# Patient Record
Sex: FEMALE | Race: ASIAN
[De-identification: names, ages, dates, MRNs, and addresses within clinical notes are randomized per-mention and may not be internally consistent; named-entity substitution may affect disease eponyms.]

---

## 2019-06-04 ENCOUNTER — HOSPITAL ENCOUNTER (OUTPATIENT)
Dept: HOSPITAL 76 - EMS | Age: 39
Discharge: ANOTHER HOSPITAL | End: 2019-06-04
Attending: SURGERY
Payer: COMMERCIAL

## 2019-06-04 ENCOUNTER — HOSPITAL ENCOUNTER (OUTPATIENT)
Dept: HOSPITAL 73 - ED | Age: 39
Setting detail: OBSERVATION
LOS: 4 days | Discharge: HOME | End: 2019-06-08
Payer: OTHER GOVERNMENT

## 2019-06-04 VITALS
OXYGEN SATURATION: 99 % | DIASTOLIC BLOOD PRESSURE: 98 MMHG | RESPIRATION RATE: 14 BRPM | SYSTOLIC BLOOD PRESSURE: 153 MMHG | HEART RATE: 94 BPM

## 2019-06-04 VITALS
DIASTOLIC BLOOD PRESSURE: 100 MMHG | RESPIRATION RATE: 12 BRPM | SYSTOLIC BLOOD PRESSURE: 148 MMHG | OXYGEN SATURATION: 97 % | HEART RATE: 97 BPM

## 2019-06-04 VITALS
HEART RATE: 103 BPM | BODY MASS INDEX: 30.4 KG/M2 | SYSTOLIC BLOOD PRESSURE: 172 MMHG | OXYGEN SATURATION: 100 % | TEMPERATURE: 97.7 F | DIASTOLIC BLOOD PRESSURE: 103 MMHG | RESPIRATION RATE: 18 BRPM
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
## 2019-06-04 DIAGNOSIS — R51: ICD-10-CM

## 2019-06-04 DIAGNOSIS — M54.2: ICD-10-CM

## 2019-06-04 DIAGNOSIS — M54.5: ICD-10-CM

## 2019-06-04 DIAGNOSIS — S00.83XA: ICD-10-CM

## 2019-06-04 DIAGNOSIS — R11.0: ICD-10-CM

## 2019-06-04 DIAGNOSIS — S06.0X9A: Primary | ICD-10-CM

## 2019-06-04 DIAGNOSIS — S40.011A: ICD-10-CM

## 2019-06-04 DIAGNOSIS — R07.9: ICD-10-CM
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
## 2019-06-04 DIAGNOSIS — I10: ICD-10-CM

## 2019-06-04 DIAGNOSIS — M54.2: Primary | ICD-10-CM

## 2019-06-04 DIAGNOSIS — M25.551: ICD-10-CM

## 2019-06-04 DIAGNOSIS — R07.89: ICD-10-CM

## 2019-06-04 LAB
ADD MANUAL DIFF / SLIDE REVIEW: NO
ALBUMIN SERPL-MCNC: 4.7 G/DL (ref 3.5–5)
ALBUMIN/GLOB SERPL: 1.3 {RATIO} (ref 1–2.8)
ALP SERPL-CCNC: 108 U/L (ref 38–126)
ALT SERPL-CCNC: 33 IU/L (ref 9–52)
BUN SERPL-MCNC: 13 MG/DL (ref 7–17)
CALCIUM SERPL-MCNC: 9.3 MG/DL (ref 8.4–10.2)
CHLORIDE SERPL-SCNC: 100 MMOL/L (ref 98–107)
CO2 SERPL-SCNC: 25 MMOL/L (ref 22–32)
CULTURE INDICATED URINE: (no result)
ESTIMATED GLOMERULAR FILT RATE: > 60 ML/MIN (ref 60–?)
ETHANOL SERPL-MCNC: < 10 MG/DL
GLOBULIN SER CALC-MCNC: 3.6 G/DL (ref 1.7–4.1)
GLUCOSE SERPL-MCNC: 131 MG/DL (ref 70–100)
HEMATOCRIT: 42.2 % (ref 36–46)
HEMOGLOBIN: 14 G/DL (ref 12–16)
HEMOLYSIS: < 15 (ref 0–50)
LIPASE SERPL-CCNC: 57 U/L (ref 23–300)
LYMPHOCYTES # SPEC AUTO: 4300 /UL (ref 1100–4500)
MCV RBC: 86.3 FL (ref 80–100)
MEAN CORPUSCULAR HEMOGLOBIN: 28.5 PG (ref 26–34)
MEAN CORPUSCULAR HGB CONC: 33.1 % (ref 30–36)
PLATELET COUNT: 374 X10^3/UL (ref 150–400)
POTASSIUM SERPL-SCNC: 3.2 MMOL/L (ref 3.4–5.1)
PROT SERPL-MCNC: 8.3 G/DL (ref 6.3–8.2)
SODIUM SERPL-SCNC: 136 MMOL/L (ref 137–145)

## 2019-06-04 PROCEDURE — 96376 TX/PRO/DX INJ SAME DRUG ADON: CPT

## 2019-06-04 PROCEDURE — 97162 PT EVAL MOD COMPLEX 30 MIN: CPT

## 2019-06-04 PROCEDURE — 70450 CT HEAD/BRAIN W/O DYE: CPT

## 2019-06-04 PROCEDURE — 87797 DETECT AGENT NOS DNA DIR: CPT

## 2019-06-04 PROCEDURE — 80053 COMPREHEN METABOLIC PANEL: CPT

## 2019-06-04 PROCEDURE — 87086 URINE CULTURE/COLONY COUNT: CPT

## 2019-06-04 PROCEDURE — 97530 THERAPEUTIC ACTIVITIES: CPT

## 2019-06-04 PROCEDURE — 71260 CT THORAX DX C+: CPT

## 2019-06-04 PROCEDURE — 99219: CPT

## 2019-06-04 PROCEDURE — 80320 DRUG SCREEN QUANTALCOHOLS: CPT

## 2019-06-04 PROCEDURE — 73552 X-RAY EXAM OF FEMUR 2/>: CPT

## 2019-06-04 PROCEDURE — 83690 ASSAY OF LIPASE: CPT

## 2019-06-04 PROCEDURE — 96361 HYDRATE IV INFUSION ADD-ON: CPT

## 2019-06-04 PROCEDURE — 97116 GAIT TRAINING THERAPY: CPT

## 2019-06-04 PROCEDURE — 96375 TX/PRO/DX INJ NEW DRUG ADDON: CPT

## 2019-06-04 PROCEDURE — 74177 CT ABD & PELVIS W/CONTRAST: CPT

## 2019-06-04 PROCEDURE — 96374 THER/PROPH/DIAG INJ IV PUSH: CPT

## 2019-06-04 PROCEDURE — 99224: CPT

## 2019-06-04 PROCEDURE — 72125 CT NECK SPINE W/O DYE: CPT

## 2019-06-04 PROCEDURE — 99285 EMERGENCY DEPT VISIT HI MDM: CPT

## 2019-06-04 PROCEDURE — G0378 HOSPITAL OBSERVATION PER HR: HCPCS

## 2019-06-04 PROCEDURE — 99217: CPT

## 2019-06-04 PROCEDURE — 81003 URINALYSIS AUTO W/O SCOPE: CPT

## 2019-06-04 PROCEDURE — 36591 DRAW BLOOD OFF VENOUS DEVICE: CPT

## 2019-06-04 PROCEDURE — 99283 EMERGENCY DEPT VISIT LOW MDM: CPT

## 2019-06-04 PROCEDURE — 85025 COMPLETE CBC W/AUTO DIFF WBC: CPT

## 2019-06-04 PROCEDURE — 93005 ELECTROCARDIOGRAM TRACING: CPT

## 2019-06-04 PROCEDURE — 80048 BASIC METABOLIC PNL TOTAL CA: CPT

## 2019-06-04 PROCEDURE — 36415 COLL VENOUS BLD VENIPUNCTURE: CPT

## 2019-06-04 PROCEDURE — 81015 MICROSCOPIC EXAM OF URINE: CPT

## 2019-06-04 RX ADMIN — ONDANSETRON 1 BOTTLE: 4 TABLET, ORALLY DISINTEGRATING ORAL at 23:02

## 2019-06-04 RX ADMIN — HYDROMORPHONE HYDROCHLORIDE 0.5 MG: 1 INJECTION, SOLUTION INTRAMUSCULAR; INTRAVENOUS; SUBCUTANEOUS at 21:59

## 2019-06-04 RX ADMIN — SODIUM CHLORIDE 1000 ML: 0.9 INJECTION, SOLUTION INTRAVENOUS at 21:59

## 2019-06-04 RX ADMIN — HYDROCODONE BITARTRATE AND ACETAMINOPHEN 1 BOTTLE: 5; 325 TABLET ORAL at 23:02

## 2019-06-04 RX ADMIN — ONDANSETRON 4 MG: 2 INJECTION INTRAMUSCULAR; INTRAVENOUS at 23:25
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
## 2019-06-04 NOTE — PC.NURSE
Attempted to ambulate patient twice. Each time pt becomes very dizzy, nauseous, and vomits despite administration of zofran. Dr Rdz aware, reevaluating dispo plan.
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
## 2019-06-05 VITALS
HEART RATE: 65 BPM | OXYGEN SATURATION: 97 % | RESPIRATION RATE: 16 BRPM | TEMPERATURE: 97.2 F | DIASTOLIC BLOOD PRESSURE: 81 MMHG | SYSTOLIC BLOOD PRESSURE: 130 MMHG

## 2019-06-05 VITALS
DIASTOLIC BLOOD PRESSURE: 98 MMHG | HEART RATE: 90 BPM | SYSTOLIC BLOOD PRESSURE: 147 MMHG | RESPIRATION RATE: 18 BRPM | OXYGEN SATURATION: 98 %

## 2019-06-05 VITALS
RESPIRATION RATE: 16 BRPM | OXYGEN SATURATION: 97 % | HEART RATE: 53 BPM | DIASTOLIC BLOOD PRESSURE: 76 MMHG | TEMPERATURE: 98.24 F | SYSTOLIC BLOOD PRESSURE: 143 MMHG

## 2019-06-05 VITALS
DIASTOLIC BLOOD PRESSURE: 86 MMHG | SYSTOLIC BLOOD PRESSURE: 150 MMHG | HEART RATE: 56 BPM | TEMPERATURE: 97.1 F | RESPIRATION RATE: 16 BRPM | OXYGEN SATURATION: 98 %

## 2019-06-05 VITALS — SYSTOLIC BLOOD PRESSURE: 150 MMHG | HEART RATE: 56 BPM | DIASTOLIC BLOOD PRESSURE: 86 MMHG

## 2019-06-05 VITALS
OXYGEN SATURATION: 99 % | SYSTOLIC BLOOD PRESSURE: 145 MMHG | HEART RATE: 75 BPM | DIASTOLIC BLOOD PRESSURE: 102 MMHG | TEMPERATURE: 97.88 F | RESPIRATION RATE: 18 BRPM

## 2019-06-05 VITALS
DIASTOLIC BLOOD PRESSURE: 95 MMHG | TEMPERATURE: 98.24 F | OXYGEN SATURATION: 95 % | HEART RATE: 78 BPM | SYSTOLIC BLOOD PRESSURE: 136 MMHG | RESPIRATION RATE: 18 BRPM

## 2019-06-05 VITALS — SYSTOLIC BLOOD PRESSURE: 153 MMHG | DIASTOLIC BLOOD PRESSURE: 79 MMHG

## 2019-06-05 RX ADMIN — OXYCODONE AND ACETAMINOPHEN 1 TAB: 5; 325 TABLET ORAL at 19:40

## 2019-06-05 RX ADMIN — SODIUM CHLORIDE 125 ML: 0.9 INJECTION, SOLUTION INTRAVENOUS at 02:00

## 2019-06-05 RX ADMIN — KETOROLAC TROMETHAMINE 30 MG: 30 INJECTION, SOLUTION INTRAMUSCULAR at 17:47

## 2019-06-05 RX ADMIN — DEXTROSE AND SODIUM CHLORIDE 84 ML: 5; 450 INJECTION, SOLUTION INTRAVENOUS at 11:07

## 2019-06-05 RX ADMIN — KETOROLAC TROMETHAMINE 30 MG: 30 INJECTION, SOLUTION INTRAMUSCULAR at 11:05

## 2019-06-05 RX ADMIN — CHLORTHALIDONE 25 MG: 25 TABLET ORAL at 13:07

## 2019-06-05 RX ADMIN — LOSARTAN POTASSIUM 50 MG: 50 TABLET, FILM COATED ORAL at 20:58

## 2019-06-05 RX ADMIN — MORPHINE SULFATE 2 MG: 2 INJECTION, SOLUTION INTRAMUSCULAR; INTRAVENOUS at 06:50

## 2019-06-05 RX ADMIN — ONDANSETRON 4 MG: 2 INJECTION INTRAMUSCULAR; INTRAVENOUS at 17:48

## 2019-06-05 RX ADMIN — DEXTROSE AND SODIUM CHLORIDE 84 ML: 5; 450 INJECTION, SOLUTION INTRAVENOUS at 20:56

## 2019-06-05 RX ADMIN — MORPHINE SULFATE 2 MG: 2 INJECTION, SOLUTION INTRAMUSCULAR; INTRAVENOUS at 01:58

## 2019-06-05 RX ADMIN — SODIUM CHLORIDE 125 ML: 0.9 INJECTION, SOLUTION INTRAVENOUS at 08:56

## 2019-06-05 RX ADMIN — OXYCODONE AND ACETAMINOPHEN 1 TAB: 5; 325 TABLET ORAL at 13:10

## 2019-06-05 NOTE — PC.ADMIT
[facility] [facility]

Admission Note:

The patient,Romana Serrano,39 y/o, was given written information regarding hospital policies, unit procedures and contact persons.

Patient's smoking status: Never smoker.
Vital Signs - 8 hr

 06/04/19
20:50 06/04/19
22:00 06/04/19
22:30
Temperature 97.7 F  
Pulse Rate 103 H 97 H 94 H
Respiratory Rate 18 12 14
Blood Pressure 172/103 H  
Blood Pressure [Left Arm]  148/100 H 153/98 H
Pulse Oximetry 100 97 99

 06/05/19
00:25 06/05/19
01:00 06/05/19
04:30
Temperature  98.2 F 97.2 F L
Pulse Rate 90 78 65
Respiratory Rate 18 18 16
Blood Pressure 147/98 H 136/95 H 130/81
Blood Pressure [Left Arm]   
Pulse Oximetry 98 95 97

0100-Admitted to Rm 103, drowsy and quiet, keeps repeating  my head hurts . She does answer questions appropriately and follows directions, but  answered most of admit questions. NS at 125ml/hr infusing, 2mg IV morphine given for headache, 
ice pack placed to back of neck, HOB elevated, VSS. See assessment notes.

## 2019-06-05 NOTE — PC.NURSE
Addendum entered by Manjula Esposito R.N.  06/05/19 19:47: 

1940 - Pt reports persistent headache, and nausea,  when I have to open my eyes.  Denies vision change. Pupils reactive. Percocet given. 

Addendum entered by Manjula Esposito R.N.  06/05/19 19:05: 

1900 - Follow up with pt on pain management and nausea. Pt snoring. Allowed to sleep. 

Addendum entered by Manjula Esposito R.N.  06/05/19 18:20: 

1815 - Pt up to bedside commode. Reports feeling dizzy and nauseated. Following return to bed pt had bile-tinged emesis of 350cc. Pupils reactive. Denies vision or hearing changes however does report  talking makes it worse.  Referring to headache. 
Cool wash cloth and ice pack provided. Call light in reach. 

Original Note:

Pt awakens to light stimuli. Reports headache on anterior right head in area of inflammation. Reports nausea without emesis. Zofran and Toradol given. Pt denies urge to void. Call light in reach. Monitor.
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
## 2019-06-05 NOTE — PC.NURSE
Dayshift Nursing Note: Pt checked on and assessed. Pt reports that she has head-ache pain up to 8/10. Pt reports she is still dizzy with nausea with standing, especially going from laying to sitting. Pt does not want to eat at this time, afraid she 
will be nauseated. Pt with BP mildly elevated, 145/102 and then 137/99. Pt's  at bedside. Apprised of current situation. Will notify MD of ongoing pain without pain medication available to give at this time and elevated BP.

## 2019-06-06 VITALS
DIASTOLIC BLOOD PRESSURE: 85 MMHG | HEART RATE: 72 BPM | OXYGEN SATURATION: 97 % | SYSTOLIC BLOOD PRESSURE: 141 MMHG | TEMPERATURE: 97.7 F | RESPIRATION RATE: 20 BRPM

## 2019-06-06 VITALS
DIASTOLIC BLOOD PRESSURE: 91 MMHG | SYSTOLIC BLOOD PRESSURE: 156 MMHG | HEART RATE: 55 BPM | TEMPERATURE: 97.5 F | RESPIRATION RATE: 18 BRPM | OXYGEN SATURATION: 98 %

## 2019-06-06 VITALS
OXYGEN SATURATION: 98 % | TEMPERATURE: 98.24 F | SYSTOLIC BLOOD PRESSURE: 123 MMHG | DIASTOLIC BLOOD PRESSURE: 75 MMHG | HEART RATE: 55 BPM | RESPIRATION RATE: 16 BRPM

## 2019-06-06 VITALS
HEART RATE: 68 BPM | RESPIRATION RATE: 18 BRPM | OXYGEN SATURATION: 96 % | TEMPERATURE: 97.88 F | DIASTOLIC BLOOD PRESSURE: 84 MMHG | SYSTOLIC BLOOD PRESSURE: 136 MMHG

## 2019-06-06 VITALS
TEMPERATURE: 98.42 F | SYSTOLIC BLOOD PRESSURE: 118 MMHG | OXYGEN SATURATION: 96 % | HEART RATE: 63 BPM | DIASTOLIC BLOOD PRESSURE: 71 MMHG | RESPIRATION RATE: 16 BRPM

## 2019-06-06 VITALS
DIASTOLIC BLOOD PRESSURE: 91 MMHG | OXYGEN SATURATION: 100 % | TEMPERATURE: 98 F | SYSTOLIC BLOOD PRESSURE: 144 MMHG | HEART RATE: 65 BPM | RESPIRATION RATE: 16 BRPM

## 2019-06-06 VITALS
TEMPERATURE: 98.5 F | HEART RATE: 66 BPM | DIASTOLIC BLOOD PRESSURE: 86 MMHG | SYSTOLIC BLOOD PRESSURE: 151 MMHG | RESPIRATION RATE: 20 BRPM | OXYGEN SATURATION: 96 %

## 2019-06-06 LAB
ADD MANUAL DIFF / SLIDE REVIEW: NO
BUN SERPL-MCNC: 10 MG/DL (ref 7–17)
CALCIUM SERPL-MCNC: 9.3 MG/DL (ref 8.4–10.2)
CHLORIDE SERPL-SCNC: 98 MMOL/L (ref 98–107)
CO2 SERPL-SCNC: 32 MMOL/L (ref 22–32)
ESTIMATED GLOMERULAR FILT RATE: > 60 ML/MIN (ref 60–?)
GLUCOSE SERPL-MCNC: 119 MG/DL (ref 70–100)
HEMATOCRIT: 42 % (ref 36–46)
HEMOGLOBIN: 13.7 G/DL (ref 12–16)
HEMOLYSIS: < 15 (ref 0–50)
LYMPHOCYTES # SPEC AUTO: 2500 /UL (ref 1100–4500)
MCV RBC: 86.3 FL (ref 80–100)
MEAN CORPUSCULAR HEMOGLOBIN: 28.3 PG (ref 26–34)
MEAN CORPUSCULAR HGB CONC: 32.7 % (ref 30–36)
PLATELET COUNT: 363 X10^3/UL (ref 150–400)
POTASSIUM SERPL-SCNC: 3.3 MMOL/L (ref 3.4–5.1)
SODIUM SERPL-SCNC: 137 MMOL/L (ref 137–145)

## 2019-06-06 RX ADMIN — CHLORTHALIDONE 25 MG: 25 TABLET ORAL at 08:07

## 2019-06-06 RX ADMIN — DEXTROSE, SODIUM CHLORIDE, AND POTASSIUM CHLORIDE 84 MEQ: 5; .45; .3 INJECTION INTRAVENOUS at 16:24

## 2019-06-06 RX ADMIN — OXYCODONE AND ACETAMINOPHEN 1 TAB: 5; 325 TABLET ORAL at 08:10

## 2019-06-06 RX ADMIN — POLYETHYLENE GLYCOL 3350 17 GM: 17 POWDER, FOR SOLUTION ORAL at 17:29

## 2019-06-06 RX ADMIN — DEXAMETHASONE SODIUM PHOSPHATE 10 MG: 10 INJECTION INTRAMUSCULAR; INTRAVENOUS at 09:50

## 2019-06-06 RX ADMIN — SCOPALAMINE 1 PATCH: 1 PATCH, EXTENDED RELEASE TRANSDERMAL at 14:34

## 2019-06-06 RX ADMIN — OXYCODONE AND ACETAMINOPHEN 1 TAB: 5; 325 TABLET ORAL at 16:24

## 2019-06-06 RX ADMIN — ONDANSETRON 4 MG: 2 INJECTION INTRAMUSCULAR; INTRAVENOUS at 01:14

## 2019-06-06 RX ADMIN — KETOROLAC TROMETHAMINE 30 MG: 30 INJECTION, SOLUTION INTRAMUSCULAR at 06:38

## 2019-06-06 RX ADMIN — LOSARTAN POTASSIUM 50 MG: 50 TABLET, FILM COATED ORAL at 20:56

## 2019-06-06 RX ADMIN — KETOROLAC TROMETHAMINE 30 MG: 30 INJECTION, SOLUTION INTRAMUSCULAR at 01:14

## 2019-06-06 RX ADMIN — OXYCODONE AND ACETAMINOPHEN 1 TAB: 5; 325 TABLET ORAL at 04:01

## 2019-06-06 RX ADMIN — ONDANSETRON 4 MG: 2 INJECTION INTRAMUSCULAR; INTRAVENOUS at 13:38

## 2019-06-06 RX ADMIN — OXYCODONE AND ACETAMINOPHEN 1 TAB: 5; 325 TABLET ORAL at 20:56

## 2019-06-06 RX ADMIN — OXYCODONE AND ACETAMINOPHEN 1 TAB: 5; 325 TABLET ORAL at 12:26

## 2019-06-06 RX ADMIN — DEXTROSE AND SODIUM CHLORIDE 84 ML: 5; 450 INJECTION, SOLUTION INTRAVENOUS at 09:24

## 2019-06-06 RX ADMIN — ONDANSETRON 4 MG: 2 INJECTION INTRAMUSCULAR; INTRAVENOUS at 09:28

## 2019-06-06 NOTE — P.PN_ITS
Subjective    
Date Patient Seen: 06/06/19    
Time Patient Seen: 09:26    
Interval history: Just a report regarding repeat head CT this morning.  I have   
discussed the CT with radiologist who identifies a 5 mm area of petechial   
hemorrhage in the right frontal lobe.  This is a new finding subsequent to her   
original head CT.  Recommendation is to repeat the head CT today within 6 hours   
of the original 1. Patient has no neurologic changes.  I have ordered a single   
dose of Decadron 10 mg IV I have discontinued Toradol.  If there are any further  
changes on repeat CT of the head I will transfer the patient to a neuro surgical  
facility.      
    
Exam    
Vital Signs    
(past 8 hours):                         -    
    
    
    
 06/06/19    
04:47 06/06/19    
07:36    
     
Temperature 98.3 F 98 F    
     
Pulse Rate 55 L 65    
     
Respiratory Rate 16 16    
     
Blood Pressure 123/75 144/91 H    
     
Pulse Oximetry 98 100    
    
    
                                            
    
    
    
Oxygen Delivery Method         Room Air    
     
Oxygen Flow Rate               0    
    
    
    
    
    
Objective    
Labs    
    
Result Diagrams:     
                                                        06/04/19 21:10              
    
                                                        06/04/19 21:10
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
## 2019-06-06 NOTE — PC.NURSE
Addendum entered by Nicky Rachel R.N.  06/07/19 05:11: 

Pt up to the BS with assist from spouse, pt reports continues light sensitivity and mild dizziness with head movement. Pt attempting AM grooming with set up and assist by spouse. 

Original Note:

Pt denies pain to shoulder, hip or ribs at rest but state they are tender to palpation. Pt reports that ache to head is 2/10 at rest, denies dizziness while in bed. She had a yogurt at HS and denies any nausea since eating. Pt reports continued 
light sensitivity. VVS at this time and allowing pt to sleep per her requested, encouraged pt to call for assistance during the night. Spouse is rooming in.
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
## 2019-06-07 VITALS
SYSTOLIC BLOOD PRESSURE: 142 MMHG | OXYGEN SATURATION: 100 % | TEMPERATURE: 98.4 F | DIASTOLIC BLOOD PRESSURE: 79 MMHG | RESPIRATION RATE: 16 BRPM | HEART RATE: 47 BPM

## 2019-06-07 VITALS
DIASTOLIC BLOOD PRESSURE: 88 MMHG | TEMPERATURE: 97.8 F | SYSTOLIC BLOOD PRESSURE: 134 MMHG | HEART RATE: 52 BPM | OXYGEN SATURATION: 100 % | RESPIRATION RATE: 16 BRPM

## 2019-06-07 VITALS
OXYGEN SATURATION: 98 % | DIASTOLIC BLOOD PRESSURE: 80 MMHG | RESPIRATION RATE: 20 BRPM | TEMPERATURE: 98.24 F | HEART RATE: 62 BPM | SYSTOLIC BLOOD PRESSURE: 148 MMHG

## 2019-06-07 VITALS — DIASTOLIC BLOOD PRESSURE: 80 MMHG | SYSTOLIC BLOOD PRESSURE: 148 MMHG

## 2019-06-07 RX ADMIN — POLYETHYLENE GLYCOL 3350 17 GM: 17 POWDER, FOR SOLUTION ORAL at 20:35

## 2019-06-07 RX ADMIN — LOSARTAN POTASSIUM 50 MG: 50 TABLET, FILM COATED ORAL at 20:35

## 2019-06-07 RX ADMIN — DEXTROSE, SODIUM CHLORIDE, AND POTASSIUM CHLORIDE 84 MEQ: 5; .45; .3 INJECTION INTRAVENOUS at 04:06

## 2019-06-07 RX ADMIN — DEXTROSE, SODIUM CHLORIDE, AND POTASSIUM CHLORIDE 50 MEQ: 5; .45; .3 INJECTION INTRAVENOUS at 17:35

## 2019-06-07 RX ADMIN — OXYCODONE AND ACETAMINOPHEN 1 TAB: 5; 325 TABLET ORAL at 20:36

## 2019-06-07 RX ADMIN — OXYCODONE AND ACETAMINOPHEN 1 TAB: 5; 325 TABLET ORAL at 12:17

## 2019-06-07 RX ADMIN — ONDANSETRON 4 MG: 2 INJECTION INTRAMUSCULAR; INTRAVENOUS at 14:58

## 2019-06-07 RX ADMIN — POLYETHYLENE GLYCOL 3350 17 GM: 17 POWDER, FOR SOLUTION ORAL at 08:39

## 2019-06-07 RX ADMIN — CHLORTHALIDONE 25 MG: 25 TABLET ORAL at 08:39

## 2019-06-07 RX ADMIN — OXYCODONE AND ACETAMINOPHEN 1 TAB: 5; 325 TABLET ORAL at 08:39
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
## 2019-06-07 NOTE — PC.NURSE
Addendum entered by Manjula Esposito R.N.  06/07/19 21:00: 

2040 - Pt's visitors departed. Pt out of bed to void. Encouraged pt to ambulate into the bathroom. SBA, Pt with slow, deliberate movements. Continues to reports dizziness, light sensitivity and mild nausea, no emesis. RX given for pain. Encouraged 
deep breath, remaining sitting up, and po intake.  at bedside. 

Original Note:

1700 - Pt is resting in bed. Smiling, reports pain 3 of 10, denies need for pain medication at this time. States that she wanted to go home to day, but she indicated that her  was uncomfortable with her doing so. Pt reports that she did 
ambulate in the room with therapy and they recommended a walker for home use.  Offered to assist pt to ambulate, Pt declined to do so at this time. Meal provided. Call light in reach.

## 2019-06-08 VITALS
HEART RATE: 44 BPM | TEMPERATURE: 97.34 F | OXYGEN SATURATION: 98 % | DIASTOLIC BLOOD PRESSURE: 57 MMHG | SYSTOLIC BLOOD PRESSURE: 106 MMHG | RESPIRATION RATE: 16 BRPM

## 2019-06-08 VITALS
OXYGEN SATURATION: 100 % | DIASTOLIC BLOOD PRESSURE: 62 MMHG | HEART RATE: 61 BPM | SYSTOLIC BLOOD PRESSURE: 116 MMHG | TEMPERATURE: 97.34 F | RESPIRATION RATE: 16 BRPM

## 2019-06-08 RX ADMIN — POLYETHYLENE GLYCOL 3350 17 GM: 17 POWDER, FOR SOLUTION ORAL at 08:29

## 2019-06-08 RX ADMIN — CHLORTHALIDONE 25 MG: 25 TABLET ORAL at 08:29
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
## 2022-01-08 ENCOUNTER — HOSPITAL ENCOUNTER (OUTPATIENT)
Dept: HOSPITAL 73 - ED | Age: 42
Setting detail: OBSERVATION
LOS: 2 days | Discharge: HOME | End: 2022-01-10
Payer: OTHER GOVERNMENT

## 2022-01-08 VITALS — OXYGEN SATURATION: 97 %

## 2022-01-08 VITALS — HEART RATE: 76 BPM | OXYGEN SATURATION: 99 %

## 2022-01-08 VITALS — HEART RATE: 94 BPM | SYSTOLIC BLOOD PRESSURE: 179 MMHG | DIASTOLIC BLOOD PRESSURE: 99 MMHG | OXYGEN SATURATION: 100 %

## 2022-01-08 VITALS
OXYGEN SATURATION: 99 % | HEART RATE: 57 BPM | TEMPERATURE: 97.3 F | DIASTOLIC BLOOD PRESSURE: 74 MMHG | SYSTOLIC BLOOD PRESSURE: 121 MMHG | RESPIRATION RATE: 18 BRPM

## 2022-01-08 VITALS — OXYGEN SATURATION: 96 %

## 2022-01-08 VITALS
DIASTOLIC BLOOD PRESSURE: 84 MMHG | OXYGEN SATURATION: 98 % | RESPIRATION RATE: 18 BRPM | TEMPERATURE: 97 F | HEART RATE: 63 BPM | SYSTOLIC BLOOD PRESSURE: 136 MMHG

## 2022-01-08 VITALS
TEMPERATURE: 96.8 F | DIASTOLIC BLOOD PRESSURE: 82 MMHG | SYSTOLIC BLOOD PRESSURE: 124 MMHG | RESPIRATION RATE: 18 BRPM | HEART RATE: 63 BPM | OXYGEN SATURATION: 99 %

## 2022-01-08 VITALS
OXYGEN SATURATION: 98 % | TEMPERATURE: 98.8 F | HEART RATE: 67 BPM | SYSTOLIC BLOOD PRESSURE: 130 MMHG | DIASTOLIC BLOOD PRESSURE: 83 MMHG | RESPIRATION RATE: 18 BRPM

## 2022-01-08 VITALS
SYSTOLIC BLOOD PRESSURE: 179 MMHG | RESPIRATION RATE: 18 BRPM | OXYGEN SATURATION: 100 % | DIASTOLIC BLOOD PRESSURE: 99 MMHG | TEMPERATURE: 98 F | HEART RATE: 95 BPM

## 2022-01-08 VITALS — HEART RATE: 84 BPM | DIASTOLIC BLOOD PRESSURE: 91 MMHG | OXYGEN SATURATION: 100 % | SYSTOLIC BLOOD PRESSURE: 176 MMHG

## 2022-01-08 VITALS — SYSTOLIC BLOOD PRESSURE: 130 MMHG | DIASTOLIC BLOOD PRESSURE: 83 MMHG | HEART RATE: 66 BPM

## 2022-01-08 VITALS — BODY MASS INDEX: 31 KG/M2 | BODY MASS INDEX: 29.9 KG/M2 | BODY MASS INDEX: 31.9 KG/M2 | BODY MASS INDEX: 30.4 KG/M2

## 2022-01-08 VITALS
OXYGEN SATURATION: 98 % | SYSTOLIC BLOOD PRESSURE: 136 MMHG | DIASTOLIC BLOOD PRESSURE: 84 MMHG | TEMPERATURE: 97 F | HEART RATE: 63 BPM | RESPIRATION RATE: 18 BRPM

## 2022-01-08 VITALS — DIASTOLIC BLOOD PRESSURE: 95 MMHG | OXYGEN SATURATION: 100 % | HEART RATE: 77 BPM | SYSTOLIC BLOOD PRESSURE: 153 MMHG

## 2022-01-08 VITALS
SYSTOLIC BLOOD PRESSURE: 148 MMHG | RESPIRATION RATE: 18 BRPM | TEMPERATURE: 97.3 F | HEART RATE: 77 BPM | DIASTOLIC BLOOD PRESSURE: 94 MMHG | OXYGEN SATURATION: 98 %

## 2022-01-08 VITALS — OXYGEN SATURATION: 100 % | HEART RATE: 89 BPM

## 2022-01-08 VITALS — HEART RATE: 80 BPM | OXYGEN SATURATION: 99 %

## 2022-01-08 VITALS — OXYGEN SATURATION: 98 %

## 2022-01-08 VITALS — OXYGEN SATURATION: 97 % | HEART RATE: 79 BPM

## 2022-01-08 DIAGNOSIS — K80.20: Primary | ICD-10-CM

## 2022-01-08 DIAGNOSIS — I10: ICD-10-CM

## 2022-01-08 DIAGNOSIS — Z20.822: ICD-10-CM

## 2022-01-08 LAB
ADD MANUAL DIFF / SLIDE REVIEW: NO
ALBUMIN SERPL-MCNC: 4.3 G/DL (ref 3.5–5)
ALBUMIN/GLOB SERPL: 1.2 {RATIO} (ref 1–2.8)
ALP SERPL-CCNC: 87 U/L (ref 38–126)
ALT SERPL-CCNC: 16 IU/L (ref ?–35)
APPEARANCE UR: CLEAR
BILIRUBIN URINE UA: NEGATIVE
BUN SERPL-MCNC: 12 MG/DL (ref 7–17)
CALCIUM SERPL-MCNC: 9.2 MG/DL (ref 8.4–10.2)
CHLORIDE SERPL-SCNC: 104 MMOL/L (ref 98–107)
CO2 SERPL-SCNC: 28 MMOL/L (ref 22–32)
COLOR UR: YELLOW
ESTIMATED GLOMERULAR FILT RATE: > 60 ML/MIN (ref 60–?)
GLOBULIN SER CALC-MCNC: 3.6 G/DL (ref 1.7–4.1)
GLUCOSE SERPL-MCNC: 126 MG/DL (ref 70–100)
GLUCOSE URINE UA: NEGATIVE G/DL
HEMATOCRIT: 37.7 % (ref 36–46)
HEMOGLOBIN: 12.6 G/DL (ref 12–16)
HEMOLYSIS: 20 (ref 0–50)
HGB UR QL: NEGATIVE
INR PPP: 0.9 (ref 0.9–1.3)
KETONES URINE UA: NEGATIVE
LEUKOCYTE ESTERASE URINE UA: NEGATIVE
LIPASE SERPL-CCNC: 63 U/L (ref 23–300)
LYMPHOCYTES # SPEC AUTO: 4600 /UL (ref 1100–4500)
MCV RBC: 87.5 FL (ref 80–100)
MEAN CORPUSCULAR HEMOGLOBIN: 29.2 PG (ref 26–34)
MEAN CORPUSCULAR HGB CONC: 33.3 % (ref 30–36)
NITRITE URINE UA: NEGATIVE
PH UR: 6.5 [PH] (ref 4.5–8)
PLATELET COUNT: 362 X10^3/UL (ref 150–400)
POTASSIUM SERPL-SCNC: 3.5 MMOL/L (ref 3.4–5.1)
PROT SERPL-MCNC: 7.9 G/DL (ref 6.3–8.2)
PROTEIN URINE UA: NEGATIVE
PROTHROMBIN TIME: 10 SECONDS (ref 10.1–12.7)
PTT PARTIAL THROMBOPLASTIN TIM: 33 SECONDS (ref 26.4–36.2)
SODIUM SERPL-SCNC: 136 MMOL/L (ref 137–145)
SP GR UR: 1.01 (ref 1–1.03)
URINE COMMENTS: (no result)
UROBILINOGEN UR QL: 0.2 E.U./DL

## 2022-01-08 PROCEDURE — 76705 ECHO EXAM OF ABDOMEN: CPT

## 2022-01-08 PROCEDURE — G0378 HOSPITAL OBSERVATION PER HR: HCPCS

## 2022-01-08 PROCEDURE — 36415 COLL VENOUS BLD VENIPUNCTURE: CPT

## 2022-01-08 PROCEDURE — 87635 SARS-COV-2 COVID-19 AMP PRB: CPT

## 2022-01-08 PROCEDURE — 85730 THROMBOPLASTIN TIME PARTIAL: CPT

## 2022-01-08 PROCEDURE — 93005 ELECTROCARDIOGRAM TRACING: CPT

## 2022-01-08 PROCEDURE — 81001 URINALYSIS AUTO W/SCOPE: CPT

## 2022-01-08 PROCEDURE — 85025 COMPLETE CBC W/AUTO DIFF WBC: CPT

## 2022-01-08 PROCEDURE — 80053 COMPREHEN METABOLIC PANEL: CPT

## 2022-01-08 PROCEDURE — 81025 URINE PREGNANCY TEST: CPT

## 2022-01-08 PROCEDURE — 94760 N-INVAS EAR/PLS OXIMETRY 1: CPT

## 2022-01-08 PROCEDURE — 80048 BASIC METABOLIC PNL TOTAL CA: CPT

## 2022-01-08 PROCEDURE — 83690 ASSAY OF LIPASE: CPT

## 2022-01-08 PROCEDURE — 96374 THER/PROPH/DIAG INJ IV PUSH: CPT

## 2022-01-08 PROCEDURE — 82962 GLUCOSE BLOOD TEST: CPT

## 2022-01-08 PROCEDURE — 85610 PROTHROMBIN TIME: CPT

## 2022-01-08 PROCEDURE — 47562 LAPAROSCOPIC CHOLECYSTECTOMY: CPT

## 2022-01-08 PROCEDURE — 96375 TX/PRO/DX INJ NEW DRUG ADDON: CPT

## 2022-01-08 PROCEDURE — 96361 HYDRATE IV INFUSION ADD-ON: CPT

## 2022-01-08 PROCEDURE — 99284 EMERGENCY DEPT VISIT MOD MDM: CPT

## 2022-01-08 PROCEDURE — 96376 TX/PRO/DX INJ SAME DRUG ADON: CPT

## 2022-01-08 PROCEDURE — 93010 ELECTROCARDIOGRAM REPORT: CPT

## 2022-01-08 PROCEDURE — 99220: CPT

## 2022-01-08 RX ADMIN — LOSARTAN POTASSIUM 100 MG: 50 TABLET, FILM COATED ORAL at 21:30

## 2022-01-08 RX ADMIN — MORPHINE SULFATE 2 MG: 2 INJECTION, SOLUTION INTRAMUSCULAR; INTRAVENOUS at 15:34

## 2022-01-08 RX ADMIN — SODIUM CHLORIDE, SODIUM LACTATE, POTASSIUM CHLORIDE, AND CALCIUM CHLORIDE 125 ML: .6; .31; .03; .02 INJECTION, SOLUTION INTRAVENOUS at 13:00

## 2022-01-08 RX ADMIN — KETOROLAC TROMETHAMINE 15 MG: 30 INJECTION, SOLUTION INTRAMUSCULAR at 03:14

## 2022-01-08 RX ADMIN — MORPHINE SULFATE 2 MG: 2 INJECTION, SOLUTION INTRAMUSCULAR; INTRAVENOUS at 08:30

## 2022-01-08 RX ADMIN — SPIRONOLACTONE 25 MG: 25 TABLET, FILM COATED ORAL at 21:30

## 2022-01-08 RX ADMIN — SODIUM CHLORIDE, SODIUM LACTATE, POTASSIUM CHLORIDE, AND CALCIUM CHLORIDE 42 ML: .6; .31; .03; .02 INJECTION, SOLUTION INTRAVENOUS at 15:37

## 2022-01-08 RX ADMIN — SODIUM CHLORIDE, SODIUM LACTATE, POTASSIUM CHLORIDE, AND CALCIUM CHLORIDE 125 ML: .6; .31; .03; .02 INJECTION, SOLUTION INTRAVENOUS at 04:59

## 2022-01-08 RX ADMIN — SODIUM CHLORIDE, SODIUM LACTATE, POTASSIUM CHLORIDE, AND CALCIUM CHLORIDE 100 ML: .6; .31; .03; .02 INJECTION, SOLUTION INTRAVENOUS at 18:31

## 2022-01-08 RX ADMIN — MORPHINE SULFATE 2 MG: 2 INJECTION, SOLUTION INTRAMUSCULAR; INTRAVENOUS at 04:34

## 2022-01-08 RX ADMIN — Medication 10 ML: at 15:36

## 2022-01-09 VITALS
TEMPERATURE: 97.52 F | HEART RATE: 64 BPM | OXYGEN SATURATION: 96 % | SYSTOLIC BLOOD PRESSURE: 145 MMHG | RESPIRATION RATE: 19 BRPM | DIASTOLIC BLOOD PRESSURE: 85 MMHG

## 2022-01-09 VITALS — HEART RATE: 128 BPM | RESPIRATION RATE: 70 BRPM | OXYGEN SATURATION: 15 % | TEMPERATURE: 97.16 F

## 2022-01-09 VITALS — DIASTOLIC BLOOD PRESSURE: 64 MMHG | SYSTOLIC BLOOD PRESSURE: 113 MMHG | HEART RATE: 75 BPM

## 2022-01-09 VITALS
OXYGEN SATURATION: 98 % | DIASTOLIC BLOOD PRESSURE: 68 MMHG | HEART RATE: 72 BPM | SYSTOLIC BLOOD PRESSURE: 119 MMHG | RESPIRATION RATE: 18 BRPM | TEMPERATURE: 96.98 F

## 2022-01-09 VITALS
SYSTOLIC BLOOD PRESSURE: 127 MMHG | OXYGEN SATURATION: 98 % | DIASTOLIC BLOOD PRESSURE: 67 MMHG | TEMPERATURE: 96.62 F | HEART RATE: 58 BPM | RESPIRATION RATE: 16 BRPM

## 2022-01-09 VITALS
RESPIRATION RATE: 14 BRPM | DIASTOLIC BLOOD PRESSURE: 54 MMHG | SYSTOLIC BLOOD PRESSURE: 98 MMHG | OXYGEN SATURATION: 98 % | HEART RATE: 55 BPM | TEMPERATURE: 97.52 F

## 2022-01-09 VITALS — OXYGEN SATURATION: 99 %

## 2022-01-09 VITALS
DIASTOLIC BLOOD PRESSURE: 64 MMHG | HEART RATE: 75 BPM | SYSTOLIC BLOOD PRESSURE: 113 MMHG | OXYGEN SATURATION: 100 % | TEMPERATURE: 98 F | RESPIRATION RATE: 16 BRPM

## 2022-01-09 VITALS — OXYGEN SATURATION: 97 %

## 2022-01-09 VITALS
TEMPERATURE: 97.16 F | DIASTOLIC BLOOD PRESSURE: 84 MMHG | HEART RATE: 83 BPM | RESPIRATION RATE: 19 BRPM | SYSTOLIC BLOOD PRESSURE: 135 MMHG | OXYGEN SATURATION: 98 %

## 2022-01-09 VITALS
HEART RATE: 95 BPM | RESPIRATION RATE: 20 BRPM | SYSTOLIC BLOOD PRESSURE: 131 MMHG | TEMPERATURE: 97.34 F | DIASTOLIC BLOOD PRESSURE: 85 MMHG | OXYGEN SATURATION: 93 %

## 2022-01-09 VITALS
RESPIRATION RATE: 13 BRPM | SYSTOLIC BLOOD PRESSURE: 130 MMHG | OXYGEN SATURATION: 98 % | DIASTOLIC BLOOD PRESSURE: 65 MMHG | HEART RATE: 64 BPM

## 2022-01-09 VITALS
OXYGEN SATURATION: 99 % | RESPIRATION RATE: 14 BRPM | SYSTOLIC BLOOD PRESSURE: 139 MMHG | HEART RATE: 67 BPM | DIASTOLIC BLOOD PRESSURE: 84 MMHG

## 2022-01-09 VITALS
SYSTOLIC BLOOD PRESSURE: 138 MMHG | RESPIRATION RATE: 16 BRPM | TEMPERATURE: 97.16 F | DIASTOLIC BLOOD PRESSURE: 76 MMHG | HEART RATE: 72 BPM | OXYGEN SATURATION: 97 %

## 2022-01-09 VITALS
OXYGEN SATURATION: 96 % | SYSTOLIC BLOOD PRESSURE: 101 MMHG | DIASTOLIC BLOOD PRESSURE: 65 MMHG | HEART RATE: 78 BPM | TEMPERATURE: 98.7 F | RESPIRATION RATE: 15 BRPM

## 2022-01-09 VITALS
HEART RATE: 64 BPM | DIASTOLIC BLOOD PRESSURE: 72 MMHG | RESPIRATION RATE: 14 BRPM | OXYGEN SATURATION: 100 % | SYSTOLIC BLOOD PRESSURE: 143 MMHG

## 2022-01-09 VITALS
OXYGEN SATURATION: 95 % | HEART RATE: 68 BPM | TEMPERATURE: 97.16 F | SYSTOLIC BLOOD PRESSURE: 122 MMHG | RESPIRATION RATE: 20 BRPM | DIASTOLIC BLOOD PRESSURE: 69 MMHG

## 2022-01-09 VITALS
DIASTOLIC BLOOD PRESSURE: 91 MMHG | SYSTOLIC BLOOD PRESSURE: 131 MMHG | OXYGEN SATURATION: 95 % | TEMPERATURE: 96.7 F | RESPIRATION RATE: 20 BRPM | HEART RATE: 73 BPM

## 2022-01-09 VITALS
HEART RATE: 62 BPM | RESPIRATION RATE: 17 BRPM | DIASTOLIC BLOOD PRESSURE: 73 MMHG | SYSTOLIC BLOOD PRESSURE: 139 MMHG | OXYGEN SATURATION: 98 %

## 2022-01-09 VITALS — OXYGEN SATURATION: 98 %

## 2022-01-09 VITALS
RESPIRATION RATE: 14 BRPM | HEART RATE: 73 BPM | OXYGEN SATURATION: 98 % | DIASTOLIC BLOOD PRESSURE: 64 MMHG | SYSTOLIC BLOOD PRESSURE: 125 MMHG

## 2022-01-09 VITALS — OXYGEN SATURATION: 100 %

## 2022-01-09 LAB
ADD MANUAL DIFF / SLIDE REVIEW: NO
BUN SERPL-MCNC: 6 MG/DL (ref 7–17)
CALCIUM SERPL-MCNC: 9.1 MG/DL (ref 8.4–10.2)
CHLORIDE SERPL-SCNC: 106 MMOL/L (ref 98–107)
CO2 SERPL-SCNC: 32 MMOL/L (ref 22–32)
ESTIMATED GLOMERULAR FILT RATE: > 60 ML/MIN (ref 60–?)
GLUCOSE SERPL-MCNC: 102 MG/DL (ref 70–100)
HEMATOCRIT: 37.2 % (ref 36–46)
HEMOGLOBIN: 12.3 G/DL (ref 12–16)
HEMOLYSIS: < 15 (ref 0–50)
LYMPHOCYTES # SPEC AUTO: 2600 /UL (ref 1100–4500)
MCV RBC: 87.5 FL (ref 80–100)
MEAN CORPUSCULAR HEMOGLOBIN: 28.9 PG (ref 26–34)
MEAN CORPUSCULAR HGB CONC: 33.1 % (ref 30–36)
PLATELET COUNT: 317 X10^3/UL (ref 150–400)
POTASSIUM SERPL-SCNC: 3.7 MMOL/L (ref 3.4–5.1)
SODIUM SERPL-SCNC: 141 MMOL/L (ref 137–145)

## 2022-01-09 PROCEDURE — 0FT44ZZ RESECTION OF GALLBLADDER, PERCUTANEOUS ENDOSCOPIC APPROACH: ICD-10-PCS | Performed by: SURGERY

## 2022-01-09 RX ADMIN — Medication 10 ML: at 21:25

## 2022-01-09 RX ADMIN — BUPIVACAINE HYDROCHLORIDE 30 ML: 5 INJECTION, SOLUTION EPIDURAL; INTRACAUDAL; PERINEURAL at 10:11

## 2022-01-09 RX ADMIN — SPIRONOLACTONE 25 MG: 25 TABLET, FILM COATED ORAL at 21:25

## 2022-01-09 RX ADMIN — LIDOCAINE HYDROCHLORIDE AND EPINEPHRINE 20 ML: 10; 10 INJECTION, SOLUTION INFILTRATION; PERINEURAL at 10:11

## 2022-01-09 RX ADMIN — SODIUM CHLORIDE, SODIUM LACTATE, POTASSIUM CHLORIDE, AND CALCIUM CHLORIDE 84 ML: .6; .31; .03; .02 INJECTION, SOLUTION INTRAVENOUS at 09:38

## 2022-01-09 RX ADMIN — KETOROLAC TROMETHAMINE 30 MG: 30 INJECTION, SOLUTION INTRAMUSCULAR at 11:12

## 2022-01-09 RX ADMIN — HYDROCODONE BITARTRATE AND ACETAMINOPHEN 1 TAB: 5; 325 TABLET ORAL at 17:11

## 2022-01-09 RX ADMIN — CEFAZOLIN SODIUM 2 GM: 2 INJECTION, SOLUTION INTRAVENOUS at 09:55

## 2022-01-09 RX ADMIN — HYDROMORPHONE HYDROCHLORIDE 0 MG: 2 INJECTION, SOLUTION INTRAMUSCULAR; INTRAVENOUS; SUBCUTANEOUS at 11:20

## 2022-01-09 RX ADMIN — LOSARTAN POTASSIUM 100 MG: 50 TABLET, FILM COATED ORAL at 21:25

## 2022-01-09 RX ADMIN — SODIUM CHLORIDE, SODIUM LACTATE, POTASSIUM CHLORIDE, AND CALCIUM CHLORIDE 84 ML: .6; .31; .03; .02 INJECTION, SOLUTION INTRAVENOUS at 10:27

## 2022-01-09 RX ADMIN — KETOROLAC TROMETHAMINE 30 MG: 30 INJECTION, SOLUTION INTRAMUSCULAR at 17:12

## 2022-01-09 RX ADMIN — MORPHINE SULFATE 2 MG: 2 INJECTION, SOLUTION INTRAMUSCULAR; INTRAVENOUS at 12:23

## 2022-01-09 RX ADMIN — SODIUM CHLORIDE, SODIUM LACTATE, POTASSIUM CHLORIDE, AND CALCIUM CHLORIDE 100 ML: .6; .31; .03; .02 INJECTION, SOLUTION INTRAVENOUS at 04:16

## 2022-01-09 RX ADMIN — HYDROCODONE BITARTRATE AND ACETAMINOPHEN 1 TAB: 5; 325 TABLET ORAL at 13:24

## 2022-01-09 RX ADMIN — HYDROCODONE BITARTRATE AND ACETAMINOPHEN 1 TAB: 5; 325 TABLET ORAL at 21:26

## 2022-01-09 RX ADMIN — MORPHINE SULFATE 2 MG: 2 INJECTION, SOLUTION INTRAMUSCULAR; INTRAVENOUS at 16:18

## 2022-01-09 RX ADMIN — ONDANSETRON 4 MG: 2 INJECTION INTRAMUSCULAR; INTRAVENOUS at 11:25

## 2022-01-10 VITALS
HEART RATE: 57 BPM | RESPIRATION RATE: 14 BRPM | OXYGEN SATURATION: 98 % | DIASTOLIC BLOOD PRESSURE: 68 MMHG | SYSTOLIC BLOOD PRESSURE: 116 MMHG | TEMPERATURE: 97.34 F

## 2022-01-10 VITALS — HEART RATE: 53 BPM | DIASTOLIC BLOOD PRESSURE: 69 MMHG | SYSTOLIC BLOOD PRESSURE: 121 MMHG

## 2022-01-10 VITALS — DIASTOLIC BLOOD PRESSURE: 63 MMHG | SYSTOLIC BLOOD PRESSURE: 109 MMHG | HEART RATE: 62 BPM

## 2022-01-10 VITALS — OXYGEN SATURATION: 99 %

## 2022-01-10 VITALS — OXYGEN SATURATION: 98 %

## 2022-01-10 VITALS — TEMPERATURE: 97.7 F | RESPIRATION RATE: 14 BRPM | OXYGEN SATURATION: 98 % | HEART RATE: 57 BPM

## 2022-01-10 VITALS
HEART RATE: 57 BPM | OXYGEN SATURATION: 100 % | TEMPERATURE: 97.16 F | DIASTOLIC BLOOD PRESSURE: 77 MMHG | SYSTOLIC BLOOD PRESSURE: 129 MMHG | RESPIRATION RATE: 18 BRPM

## 2022-01-10 RX ADMIN — HYDROCODONE BITARTRATE AND ACETAMINOPHEN 1 TAB: 5; 325 TABLET ORAL at 03:41

## 2022-01-10 RX ADMIN — HYDROCODONE BITARTRATE AND ACETAMINOPHEN 1 TAB: 5; 325 TABLET ORAL at 07:45

## 2022-01-10 RX ADMIN — ONDANSETRON 4 MG: 2 INJECTION INTRAMUSCULAR; INTRAVENOUS at 07:44

## 2022-01-10 RX ADMIN — HYDROCODONE BITARTRATE AND ACETAMINOPHEN 1 TAB: 5; 325 TABLET ORAL at 13:39

## 2022-01-10 RX ADMIN — Medication 10 ML: at 09:44
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
